# Patient Record
Sex: FEMALE | Race: BLACK OR AFRICAN AMERICAN | NOT HISPANIC OR LATINO | URBAN - METROPOLITAN AREA
[De-identification: names, ages, dates, MRNs, and addresses within clinical notes are randomized per-mention and may not be internally consistent; named-entity substitution may affect disease eponyms.]

---

## 2023-08-28 ENCOUNTER — OFFICE VISIT (OUTPATIENT)
Dept: URGENT CARE | Facility: CLINIC | Age: 43
End: 2023-08-28
Payer: COMMERCIAL

## 2023-08-28 ENCOUNTER — APPOINTMENT (OUTPATIENT)
Dept: RADIOLOGY | Facility: CLINIC | Age: 43
End: 2023-08-28
Payer: COMMERCIAL

## 2023-08-28 VITALS
OXYGEN SATURATION: 100 % | TEMPERATURE: 98 F | SYSTOLIC BLOOD PRESSURE: 151 MMHG | DIASTOLIC BLOOD PRESSURE: 72 MMHG | HEART RATE: 77 BPM | RESPIRATION RATE: 16 BRPM

## 2023-08-28 DIAGNOSIS — S83.91XA SPRAIN OF RIGHT KNEE, UNSPECIFIED LIGAMENT, INITIAL ENCOUNTER: ICD-10-CM

## 2023-08-28 DIAGNOSIS — S83.91XA SPRAIN OF RIGHT KNEE, UNSPECIFIED LIGAMENT, INITIAL ENCOUNTER: Primary | ICD-10-CM

## 2023-08-28 PROCEDURE — 99203 OFFICE O/P NEW LOW 30 MIN: CPT | Performed by: PHYSICIAN ASSISTANT

## 2023-08-28 PROCEDURE — 73564 X-RAY EXAM KNEE 4 OR MORE: CPT

## 2023-08-28 RX ORDER — DALFAMPRIDINE 10 MG/1
TABLET, FILM COATED, EXTENDED RELEASE ORAL
COMMUNITY
Start: 2023-07-14

## 2023-08-28 RX ORDER — ERGOCALCIFEROL 1.25 MG/1
50000 CAPSULE ORAL WEEKLY
COMMUNITY

## 2023-08-28 RX ORDER — INTERFERON BETA-1A 30MCG/.5ML
1 KIT INTRAMUSCULAR
COMMUNITY

## 2023-08-28 NOTE — PROGRESS NOTES
[facility]        NAME: Felix Mao is a 38 y.o. female  : 1980    MRN: [id number]  DATE: 2023  TIME: 2:30 PM    Assessment and Plan   Sprain of right knee, unspecified ligament, initial encounter [S83.91XA]  1. Sprain of right knee, unspecified ligament, initial encounter  XR knee 4+ vw right injury        Given a cane for support. Patient Instructions   Patient Instructions   Follow-up with your primary care provider in the next 3-5 days. Any new or worsening symptoms develop get re-evaluated sooner or proceed to the ER. Radiologists reading of xray results will be available in a few hours. Follow up with PCP in 3-5 days. Proceed to  ER if symptoms worsen. Chief Complaint     Chief Complaint   Patient presents with   • Knee Pain     Right knee pain x7 days. Patient was using leg extension equipment 3 days a week. Swelling started today. History of Present Illness       Patient presents with right knee pain and swelling. Pain started 2 weeks ago. Swelling started today. Pain started after working out one day. Was at [facility] [employer] [facility] [employer] yesterday but didn't do anything too strenuous. Can walk on it but with some pain. Pain mainly over the front of the knee just past the knee cap. Denies recent travel, calf pain, fevers, chest pains, SOB, dyspnea, fatigue, numbness/tingling,   Denies any history of blood clots. Review of Systems   Review of Systems   Constitutional: Negative for fever. Musculoskeletal: Positive for arthralgias and joint swelling. Skin: Negative for rash. Neurological: Negative for weakness and numbness.          Current Medications       Current Outpatient Medications:   •  Calcium Carbonate-Vitamin D 300-2.5 MG-MCG CAPS, Take 1 tablet by mouth daily, Disp: , Rfl:   •  Cholecalciferol 125 MCG (5000 UT) TABS, Take 5,000 Units by mouth daily, Disp: , Rfl:   •  Ocrelizumab (OCREVUS IV), Inject into a catheter in a vein, Disp: , Rfl:   • Dalfampridine ER (Ampyra) 10 MG TB12, Ampyra 10 MG Oral Tablet Extended Release 12 Hour QTY: 60 tablet Days: 30 Refills: 5  Written: 07/14/23 Patient Instructions: Take 1 po bid (Patient not taking: Reported on 8/28/2023), Disp: , Rfl:   •  ergocalciferol (ERGOCALCIFEROL) 1.25 MG (25296 UT) capsule, Take 50,000 Units by mouth once a week (Patient not taking: Reported on 8/28/2023), Disp: , Rfl:   •  Interferon Beta-1a (Avonex Prefilled) 30 MCG/0.5ML PSKT, Inject 1 Dose into a muscle (Patient not taking: Reported on 8/28/2023), Disp: , Rfl:     Current Allergies     Allergies as of 08/28/2023 - Reviewed 08/28/2023   Allergen Reaction Noted   • Codeine Rash and Hives 12/14/2011            The following portions of the patient's history were reviewed and updated as appropriate: allergies, current medications, past family history, past medical history, past social history, past surgical history and problem list.     Past Medical History:   Diagnosis Date   • Multiple sclerosis ([address])        History reviewed. No pertinent surgical history. No family history on file. Medications have been verified. Objective   /72   Pulse 77   Temp 98 °F (36.7 °C)   Resp 16   LMP 08/20/2023 (Approximate)   SpO2 100%        Physical Exam     Physical Exam  Constitutional:       Appearance: Normal appearance. Musculoskeletal:         General: Tenderness present. No swelling or deformity. Normal range of motion. Right lower leg: No edema. Left lower leg: No edema. Comments: Homans sign negative. No calf pain/tenderness. TTP just distal to the patella. No laxity/pain with varus/valgus stress. Ant. and post. Drawer negative. Calf circumference 35cm bilaterally   Skin:     General: Skin is warm. Capillary Refill: Capillary refill takes less than 2 seconds. Findings: No bruising or erythema. Neurological:      Mental Status: She is alert.    Psychiatric:         Mood and Affect: Mood normal.         Behavior: Behavior normal.

## 2023-08-28 NOTE — PATIENT INSTRUCTIONS
Follow-up with your primary care provider in the next 3-5 days. Any new or worsening symptoms develop get re-evaluated sooner or proceed to the ER. Radiologists reading of xray results will be available in a few hours.